# Patient Record
(demographics unavailable — no encounter records)

---

## 2024-11-06 NOTE — ASSESSMENT
[FreeTextEntry1] : Pt is a 66 y/o female with hx of DM, HLD, B12 deficiency, hypothyroidism, and osteopenia  Poorly controlled T2DM complicated by neuropathy A1c 7.3% -Continue Janumet 50-1000mg bid -Continue actos 30mg daily -Patient not interested in other adjustments at this time. -Patient advised to check FSBG twice daily in staggered manner and bring logs to next visit -Recent labs reviewed -UTD with ophthalmologist for dilated eye exam -Referral to nutritionist given previously  HLD -Continue pravastatin 40mg daily, check labs  Post-thyroidectomy hypothyroidism Had total thyroidectomy for hyperthyroidism in 2004. Unclear if graves or hot nodule. Taking levothyroxine since 2004. Taking 100mcg daily. No temperature intolerance, bowel movements normal. No longer uses biotin. No lithium, amiodarone, hx of radiation to head or neck. No hx of CAD, arrhythmia, CHF. -Continue LT4 100mcg daily -Patient wishes to check TSH and FT4 today -Off biotin  Osteopenia Has osteopenia. [facility] DXA in 2023 showed normal tscore in spine with -1.9 in fem neck left and -1.2 in total left hip. FRAX did not meet threshold for tx. -Repeat DXA in 2025. PTH normal.  RTC 3 months.  Sukhjinder Bateman DO.

## 2024-11-06 NOTE — HISTORY OF PRESENT ILLNESS
[FreeTextEntry1] : Pt is a 66 y/o female with hx of DM, HLD, B12 deficiency, hypothyroidism, and osteopenia  DM diagnosis: 7-8 years ago Last A1c: 8.8 --> 7.7-->7.0%-->6.6%--7.3% Previous endocrinologist: pcp Home DM meds: Janumet 50-1000mg twice daily. No more vulvovaginitis. Also on actos 30mg daily. SMBG: not checking. in office today bg 116 Symptoms: No abd pain, N/V/D Diet at home: Lives alone at home, does not cook for herself much. Likes a lot of vegetables and fruit. Microvascular complications: No nephropathy, has CKD II/IIIa, has mild right sided retinopathy, occasional neuropathy but usually not Macrovascular complications: No CVA, MI, PAD ACEi/ARB: none Statin: pravastatin 40mg daily x several years. Tried 80mg but ran out and went back to 40mg daily. PMHx: As above. No hx of HF, pancreatitis. No more vulvovaginitis. No amputations or wounds. FHx: Twin sister with DM. No thyroid cancer. SHx: No tobacco use. No etoh use.  Has post-thyroidectomy hypothyroidism. Had total thyroidectomy for hyperthyroidism in 2004. Unclear if graves or hot nodule. Taking levothyroxine since 2004. Taking 100mcg daily. No temperature intolerance, bowel movements normal. No longer uses biotin. No lithium, amiodarone, hx of radiation to head or neck. No hx of CAD, arrhythmia, CHF.  Has osteopenia. [facility] DXA in 2023 showed normal tscore in spine with -1.9 in fem neck left and -1.2 in total left hip. FRAX did not meet threshold for tx.

## 2024-12-10 NOTE — HISTORY OF PRESENT ILLNESS
[de-identified] : 69 y/o F with DM, CKD, HLD, B12 deficiency, hypothyroidism, osteopenia, depression, presenting for f/u. Past few weeks with lower back pain with radiation to BL legs L > R with burning sensation. Also describes area of localized pain above L knee. No pain with ambulation. No weakness. No incontinence or saddle anesthesia. Somewhat improved with Tylenol.  Mood stable, tried therapist, didnt take insurance L ear hearing changes stable. Occasional dizziness. No tinnitus. Seen by ENT with neg w/u

## 2024-12-10 NOTE — ASSESSMENT
[FreeTextEntry1] : . 69 y/o F with DM, CKD, HLD, B12 deficiency, hypothyroidism, osteopenia, depression, presenting for f/u. HPI as above.  # L ear hearing changes; stable - f/u ENT  # BL nocturnal leg cramps; resolved - seen by vascular, suggest neuro/ortho w/u for pain  # DM - reviewed recent A1c 7.3 - offered gabapentin for neuropathic pain in past, pt declines - UTD optho; R DM retinopathy - podiatry referral - 11/2024 endocrine note and recs reviewed: c/w Janumet  BID and actos 30mg po daily - 2021 cardio w/u noted  # CKD - likely DM related - labs roughly stable  # HLD - recent LDL 93; pravastatin - Counselled on diet, exercise, weight loss.  # B12 deficiency - monthly injection today - f/u b12  # Hypothyroidism - reviewed recent TSH wnl - c/w synthroid 100mcg po daily  # Osteopenia - f/u Vit D - counselled on fall risk - weight bearing exercises - rec 1200mg daily calcium via diet or supplement - has declined prolia etc (was osteoporosis in past?)  # Depression - No SI  # lower back pain/sciatica?  - unclear if DM neuropathy playing role in anterior thigh burning pain - f/u xray - spine and neuro eval - trial judicious topical NSAID - hot shower, hot/cold pack  # elevated BP reading - in pain today - monitor home BP and will f/u if steady >130 - Counselled on low salt diet, exercise, weight loss.   # HCM - mammo 2/2024 wnl as per pt - will get with gyn - UTD GYN, unremarkable last pap as per pt - scope 5/2022 w/o polyps and rec 5/2027 repeat - declines flu vaccine   f/u 4 months

## 2025-01-13 NOTE — PHYSICAL EXAM
[No Acute Distress] : no acute distress [Well-Appearing] : well-appearing [No Respiratory Distress] : no respiratory distress  [Coordination Grossly Intact] : coordination grossly intact [Normal Affect] : the affect was normal [Normal Insight/Judgement] : insight and judgment were intact [de-identified] : L knee with area of lateral swelling, tender to palpation

## 2025-01-13 NOTE — ASSESSMENT
[FreeTextEntry1] : . 67 y/o F with HTN, DM, CKD, HLD, B12 deficiency, hypothyroidism, osteopenia, depression, presenting for f/u. HPI as above.  # HTN - start telmisartan 20mg po daily. baseline BMP today. R/B discussed. - Counselled on low salt diet, exercise, weight loss.  # L ear hearing changes; stable - f/u ENT  # BL nocturnal leg cramps; resolved - seen by vascular, suggest neuro/ortho w/u for pain  # DM - 11/2023 A1c 7.3 - offered gabapentin for neuropathic pain in past, pt declines - UTD optho; R DM retinopathy - podiatry referral - 11/2024 endocrine note and recs reviewed: c/w Janumet  BID and actos 30mg po daily - 2021 cardio w/u noted  # CKD - likely DM related - labs roughly stable  # HLD - c/w pravastatin 80mg po daily - Counselled on diet, exercise, weight loss.  # B12 deficiency - monthly injection today  # Hypothyroidism - c/w synthroid 100mcg po daily  # Osteopenia - counselled on fall risk - weight bearing exercises - rec 1200mg daily calcium via diet or supplement - has declined prolia etc (was osteoporosis in past?)  # Depression - No SI  # lower back pain/sciatica? # L knee pain - knee pain not better with ice, po tylenol/motrin or topical NSAID - ortho f/u today, planned for back imaging- will discuss knee as well  # HCM - mammo 2/2024 wnl as per pt - will get with gyn - UTD GYN, unremarkable last pap as per pt - scope 5/2022 w/o polyps and rec 5/2027 repeat - declines flu vaccine  f/u 1 month

## 2025-01-13 NOTE — HISTORY OF PRESENT ILLNESS
[de-identified] : 69 y/o F with DM, CKD, HLD, B12 deficiency, hypothyroidism, osteopenia, depression, presenting for f/u. Still with lower back pain with radiation to BL legs L > R with burning sensation, seen by OSH ortho Dr Torsten Mcgregor and planned for MRI. Left knee worse, seen in ED over weekend with xray showing large enthesophyte. Has ortho f/u today. Ready to start HTN meds as home BP tending high. Mood stable, tried therapist, didnt take insurance L ear hearing changes stable. Occasional dizziness. No tinnitus. Seen by ENT with neg w/u

## 2025-02-13 NOTE — ASSESSMENT
[FreeTextEntry1] : . 67 y/o F with HTN, DM, CKD, HLD, B12 deficiency, hypothyroidism, osteopenia, depression, presenting for f/u. HPI as above.  # HTN- controlled - c/w telmisartan 20mg po daily. BMP today.  - Counselled on low salt diet, exercise, weight loss.  # L ear hearing changes; stable - f/u ENT  # BL nocturnal leg cramps; resolved - seen by vascular, suggest neuro/ortho w/u for pain  # DM - f/u a1c - offered gabapentin for neuropathic pain in past, pt declines - UTD optho 12/2024; R DM retinopathy - podiatry referral - meds as per endo - 2021 cardio w/u noted  # CKD - likely DM related - labs roughly stable  # HLD - c/w pravastatin 80mg po daily; f/u lipid panel - Counselled on diet, exercise, weight loss.  # B12 deficiency - monthly injection today  # Hypothyroidism - c/w synthroid 100mcg po daily  # Osteopenia - counselled on fall risk - weight bearing exercises - rec 1200mg daily calcium via diet or supplement - has declined prolia etc (was osteoporosis in past?)  # Depression - No SI  # lower back pain/sciatica? # L knee pain - would like 2nd opinion; sports med eval  # HCM - mammo scheduled next week - UTD GYN, unremarkable last pap as per pt - scope 5/2022 w/o polyps and rec 5/2027 repeat - declines flu vaccine  f/u 4 months

## 2025-02-13 NOTE — HISTORY OF PRESENT ILLNESS
[de-identified] : 69 y/o F with DM, CKD, HLD, B12 deficiency, hypothyroidism, osteopenia, depression, presenting for f/u. Still with lower back pain with radiation to BL legs L > R with burning sensation, seen by OSH ortho Dr Torsten Mcgregor, MRI also completed, results not available today. Left knee MRI 2/4/25 with mild pes anserine bursitis. Mood stable, tried therapist, didnt take insurance L ear hearing changes stable. Occasional dizziness. No tinnitus. Seen by ENT with neg w/u

## 2025-02-13 NOTE — REVIEW OF SYSTEMS
[Negative] : Heme/Lymph [FreeTextEntry4] :  as per HPI  [FreeTextEntry9] :  as per HPI  [de-identified] :  as per HPI

## 2025-03-11 NOTE — PHYSICAL EXAM
[de-identified] : On physical examination the patient is in no acute distress and neurologically intact. The lungs are clear to auscultation and the heart has a regular rate and rhythm. Abdomen is benign. Bilateral femoral and pedal pulses are palpable.  No lower extremity edema or wounds present. Normal hair growth pattern of lower extremities.

## 2025-03-11 NOTE — ASSESSMENT
[FreeTextEntry1] : In summary, Ms. SAEZ presents with left knee pain and MRI showing bursitis. She has no evidence of arterial insufficiency and prior venous duplex was unremarkable. I again provided her a prescription for knee high 20-30 mmHg compression stockings and instructed her to follow up with orthopedics for left knee pain.

## 2025-03-11 NOTE — HISTORY OF PRESENT ILLNESS
[FreeTextEntry1] : I have just had the pleasure of seeing Ms. CONONR SAEZ in consultation from Dr. Mandel for lower extremity venous insufficiency.   Ms. SAEZ is a pleasant 67 year F who presents with a history of bilateral lower extremity pain with ambulation. She reports that the right side is affected more and that she experiences pain radiating down from her back to her legs. She denies any symptoms of lower extremity rest pain, or tissue loss. She denies any history of lower extremity edema, skin pigmentation changes or ulcers. She denies any history of DVT or SVT. She reports that she was evaluated at a vein center and informed that she required a "procedure" on her legs. She reports that she underwent right leg "injections" four months ago (sclerotherapy?). She is a semi-retired  and has spent significant time at work sitting. She has attempted use of compression stockings in the past.   She denies any history of CAD, MI, CHF, CVA, TIA, or CRI.   Her medical history is otherwise significant for carpal tunnel, DJD, TMJ, [place]-Lt, diverticulosis, HLD, thyroidectomy, HLD, OA, DMII.   Medications: Pravastatin, Meloxicam, Synthroid, Vitamins, Janumet, and Actos.   Allergies: NKDA   Social history: Non-smoker   FH: NC [de-identified] : 3/11/25: Patient returns complaining of left knee pain. Per EMR she underwent MRI which showed bursitis of knee joint. She reports that she was evaluated by orthopedics and received injections. She continues to complain of knee pain. She complains of skin of the ankles. She is not wearing compression stockings as instructed.

## 2025-04-10 NOTE — ASSESSMENT
[FreeTextEntry1] : . 67 y/o F with HTN, HLD, DM, CKD, B12 deficiency, hypothyroidism, osteopenia, depression, presenting for f/u. HPI as above.  # R foot injury - using voltaren but not much relief - pt rather avoid po meds, can use nsaids judiciously given renal function - rec ice, elevated, voltaren, f/u ortho  # HTN- controlled - c/w telmisartan 20mg po daily. - Counselled on low salt diet, exercise, weight loss.  # L ear hearing changes; stable - f/u ENT  # BL nocturnal leg cramps; resolved - seen by vascular, suggest neuro/ortho w/u for pain  # DM - offered gabapentin for neuropathic pain in past, pt declines - UTD optho 12/2024; R DM retinopathy - podiatry referral - meds as per endo - 2021 cardio w/u noted  # CKD - likely DM related - labs roughly stable  # HLD - c/w pravastatin 80mg po daily - Counselled on diet, exercise, weight loss.  # B12 deficiency - monthly injection today  # Hypothyroidism - c/w synthroid 100mcg po daily  # Osteopenia - counselled on fall risk - weight bearing exercises - rec 1200mg daily calcium via diet or supplement - has declined prolia etc (was osteoporosis in past?)  # Depression - No SI  # lower back pain/sciatica? # L knee pain - would like 2nd opinion; sports med eval  # HCM - recent mammo wnl as per pt - UTD GYN, unremarkable last pap as per pt - scope 5/2022 w/o polyps and rec 5/2027 repeat  f/u 2 months.

## 2025-04-10 NOTE — HISTORY OF PRESENT ILLNESS
[de-identified] : 67 y/o F with HTN, HLD, DM, CKD, B12 deficiency, hypothyroidism, osteopenia, depression, presenting for f/u. Back pain stable Left knee MRI 2/4/25 with mild pes anserine bursitis. Mood stable, tried therapist, didnt take insurance L ear hearing changes stable. dizziness better. [place], hurt R foot, seen by ortho with neg xray

## 2025-04-10 NOTE — PHYSICAL EXAM
[No Acute Distress] : no acute distress [Well-Appearing] : well-appearing [No Lymphadenopathy] : no lymphadenopathy [No Respiratory Distress] : no respiratory distress  [No Accessory Muscle Use] : no accessory muscle use [Clear to Auscultation] : lungs were clear to auscultation bilaterally [Normal Rate] : normal rate  [Regular Rhythm] : with a regular rhythm [Soft] : abdomen soft [Non Tender] : non-tender [Non-distended] : non-distended [Coordination Grossly Intact] : coordination grossly intact [Normal Affect] : the affect was normal [Normal Insight/Judgement] : insight and judgment were intact [de-identified] : R foot dorsum mild swelling, tender

## 2025-05-22 NOTE — ASSESSMENT
[FreeTextEntry1] : Problems Assessed: 1. HTN- at goal, continue current medications. 2. HLD- continue statin at current dose.  3. Diastolic dysfunction  4. DM   Plan: - CAC for risk stratification.  -  Echocardiogram for evaluation of cardiac structure and function.  Follow-up: 1 month

## 2025-05-22 NOTE — CARDIOLOGY SUMMARY
[de-identified] : 2021: Negative for ischemia [de-identified] : 2021:  Normal systolic function, grade 1 diastolic dysfunction.

## 2025-05-22 NOTE — CARDIOLOGY SUMMARY
[de-identified] : 2021: Negative for ischemia [de-identified] : 2021:  Normal systolic function, grade 1 diastolic dysfunction.

## 2025-05-22 NOTE — REASON FOR VISIT
[FreeTextEntry1] : PCP/Referring Doctor: Dr Gamaliel Mandel  Chief Complaint: "For check up  " ------------------------------------------------------------------------------------------------------------------------------------ History of Present Illness:  68 HLD, HTN, Hypothyroidism, Diastolic dysfunction for cardiovascular risk stratification.  HLD- within normal limits. Compliant with statin.  HTN- stable.  Hypothyroidism- stable.  Patient does not talk or exercise due to broken ankle. ROS:  chest pain (-), dyspnea with exertion (-), orthopnea (-), edema (-), palpitations (-), dizziness (-) All other systems were reviewed and are negative. ------------------------------------------------------------------------------------------------------------------------------------ Past Medical History: Any history of HTN? Yes  Any history of Lipid disorders? Yes TG 90mg/dl  LDL 70mg/dl  Any history Diabetes or Prediabetes? Yes  HbA1c 7.6 Any history of MI, stroke or TIA? No  Any prior Stress Testing? 2021 Any prior Cath/Angiogram procedure?  Any prior Echocardiogram (TTE)? 2021 Any prior vascular study? Yes  Have patient been on blood thinners? No  Have patient had cardiac or vascular surgeries? No  ------------------------------------------------------------------------------------------------------------------------------------ Patient's current cardiovascular medications were reviewed and updated in chart. Yes  ------------------------------------------------------------------------------------------------------------------------------------ Family history Do you have a family history of heart attacks and/or stroke before the age of 60? No  Do you have a family history of cardiac arrest? Yes  ------------------------------------------------------------------------------------------------------------------------------------ Social History: Do you currently or previously used tobacco including cigarettes and vapes? ex smoker quitted 25 years ago. How many alcoholic drinks per week? 0 What is your occupation?   ------------------------------------------------------------------------------------------------------------------------------------ Physical Exam: General appearance: NAD Lungs: CTAB CV: RRR Extremities: No peripheral edema.

## 2025-05-22 NOTE — CARDIOLOGY SUMMARY
[de-identified] : 2021: Negative for ischemia [de-identified] : 2021:  Normal systolic function, grade 1 diastolic dysfunction.

## 2025-06-25 NOTE — ASSESSMENT
[FreeTextEntry1] : Problems Assessed: 1. HTN- stable.  2. HLD- continue statin at current dose.  3. Diastolic dysfunction  4. DM   Plan: - CAC for risk stratification- PENDING   - Echocardiogram normal systolic function, grade 1 diastolic function  - LDL from last lipid panel is at goal less than 70, continue statin at current dose.  - HTN- stable.   - will consider stress testing if symptoms arise.   Follow-up: 3 month

## 2025-06-25 NOTE — REASON FOR VISIT
[FreeTextEntry1] : PCP/Referring Doctor: Dr Gamaliel Mandel  Chief Complaint: "For check up  " ------------------------------------------------------------------------------------------------------------------------------------ History of Present Illness:  68 HLD, HTN, Hypothyroidism, Diastolic dysfunction for cardiovascular risk stratification.  HLD- within normal limits. Compliant with statin.  HTN- stable.  Hypothyroidism- stable.  Patient does not walk or exercise due to broken ankle. Is experiencing significant family stressors.  ROS:  chest pain (-), dyspnea with exertion (-), orthopnea (-), edema (-), palpitations (-), dizziness (-) All other systems were reviewed and are negative. ------------------------------------------------------------------------------------------------------------------------------------ Past Medical History: Any history of HTN? Yes  Any history of Lipid disorders? Yes TG 90mg/dl  LDL 70mg/dl  Any history Diabetes or Prediabetes? Yes  HbA1c 7.6 Any history of MI, stroke or TIA? No  Any prior Stress Testing? 2021 Any prior Cath/Angiogram procedure?  Any prior Echocardiogram (TTE)? 2021 Any prior vascular study? Yes  Have patient been on blood thinners? No  Have patient had cardiac or vascular surgeries? No  ------------------------------------------------------------------------------------------------------------------------------------ Patient's current cardiovascular medications were reviewed and updated in chart. Yes  ------------------------------------------------------------------------------------------------------------------------------------ Family history Do you have a family history of heart attacks and/or stroke before the age of 60? No  Do you have a family history of cardiac arrest? Yes  ------------------------------------------------------------------------------------------------------------------------------------ Social History: Do you currently or previously used tobacco including cigarettes and vapes? ex smoker quitted 25 years ago. How many alcoholic drinks per week? 0 What is your occupation?   ------------------------------------------------------------------------------------------------------------------------------------ Physical Exam: General appearance: NAD Lungs: CTAB CV: RRR Extremities: No peripheral edema.

## 2025-06-25 NOTE — CARDIOLOGY SUMMARY
[de-identified] : 2021: Negative for ischemia [de-identified] : CONCLUSIONS:  1. Left ventricular cavity is normal in size. Left ventricular wall thickness is normal. Left ventricular systolic function is normal with an ejection fraction of 64 % by Malin's method of disks. 2. There is mild (grade 1) left ventricular diastolic dysfunction. 3. Normal right ventricular cavity size and normal right ventricular systolic function. 4. Trace mitral regurgitation. 5. Normal left and right atrial size. 6. Structurally normal pulmonic valve with normal leaflet excursion. 7. No echocardiographic evidence of pulmonary hypertension. 8. No significant valvular disease. 9. No pericardial effusion seen. 10. Trileaflet aortic valve with normal systolic excursion.